# Patient Record
Sex: FEMALE
[De-identification: names, ages, dates, MRNs, and addresses within clinical notes are randomized per-mention and may not be internally consistent; named-entity substitution may affect disease eponyms.]

---

## 2018-09-15 ENCOUNTER — HOSPITAL ENCOUNTER (EMERGENCY)
Dept: HOSPITAL 88 - ER | Age: 78
Discharge: ANOTHER HOSPITAL | End: 2018-09-15
Payer: MEDICARE

## 2018-09-15 VITALS — BODY MASS INDEX: 32.39 KG/M2 | HEIGHT: 62 IN | WEIGHT: 176 LBS

## 2018-09-15 DIAGNOSIS — I10: ICD-10-CM

## 2018-09-15 DIAGNOSIS — J15.9: ICD-10-CM

## 2018-09-15 DIAGNOSIS — E11.9: ICD-10-CM

## 2018-09-15 DIAGNOSIS — E78.5: ICD-10-CM

## 2018-09-15 DIAGNOSIS — I25.10: ICD-10-CM

## 2018-09-15 DIAGNOSIS — R53.1: ICD-10-CM

## 2018-09-15 DIAGNOSIS — R53.83: ICD-10-CM

## 2018-09-15 DIAGNOSIS — Z95.5: ICD-10-CM

## 2018-09-15 DIAGNOSIS — R07.89: Primary | ICD-10-CM

## 2018-09-15 PROCEDURE — 84484 ASSAY OF TROPONIN QUANT: CPT

## 2018-09-15 PROCEDURE — 99284 EMERGENCY DEPT VISIT MOD MDM: CPT

## 2018-09-15 PROCEDURE — 71046 X-RAY EXAM CHEST 2 VIEWS: CPT

## 2018-09-15 PROCEDURE — 82553 CREATINE MB FRACTION: CPT

## 2018-09-15 PROCEDURE — 93005 ELECTROCARDIOGRAM TRACING: CPT

## 2018-09-15 PROCEDURE — 81003 URINALYSIS AUTO W/O SCOPE: CPT

## 2018-09-15 PROCEDURE — 85025 COMPLETE CBC W/AUTO DIFF WBC: CPT

## 2018-09-15 PROCEDURE — 80053 COMPREHEN METABOLIC PANEL: CPT

## 2018-09-15 NOTE — DIAGNOSTIC IMAGING REPORT
EXAMINATION:  CXR 2 VIEW - [facility]    



INDICATION: Chest pain      



COMPARISON:  None

     

FINDINGS:

TUBES and LINES:  None.



LUNGS:  Lungs are well inflated.  There are bibasilar atelectasis. 

Questionable left lower lobe pneumonia.



PLEURA:  No pleural effusion or pneumothorax.



HEART AND MEDIASTINUM:  The cardiomediastinal silhouette is unremarkable. There

are atherosclerotic calcifications within the aorta.



BONES AND SOFT TISSUES:  No acute osseous lesion.  Soft tissues are

unremarkable.



UPPER ABDOMEN: No free air under the diaphragm.    



IMPRESSION: 

Atelectasis. Questionable left lower lobe pneumonia.



Signed by: Dr. Miguel Childers M.D. on 9/15/2018 12:46 PM